# Patient Record
Sex: MALE | Race: WHITE | NOT HISPANIC OR LATINO
[De-identification: names, ages, dates, MRNs, and addresses within clinical notes are randomized per-mention and may not be internally consistent; named-entity substitution may affect disease eponyms.]

---

## 2022-12-01 ENCOUNTER — APPOINTMENT (OUTPATIENT)
Dept: CARE COORDINATION | Facility: HOME HEALTH | Age: 67
End: 2022-12-01

## 2022-12-01 DIAGNOSIS — F32.A DEPRESSION, UNSPECIFIED: ICD-10-CM

## 2022-12-01 DIAGNOSIS — J44.9 CHRONIC OBSTRUCTIVE PULMONARY DISEASE, UNSPECIFIED: ICD-10-CM

## 2022-12-01 DIAGNOSIS — E11.9 TYPE 2 DIABETES MELLITUS W/OUT COMPLICATIONS: ICD-10-CM

## 2022-12-01 DIAGNOSIS — I10 ESSENTIAL (PRIMARY) HYPERTENSION: ICD-10-CM

## 2022-12-01 DIAGNOSIS — F41.9 ANXIETY DISORDER, UNSPECIFIED: ICD-10-CM

## 2022-12-01 PROCEDURE — 99495 TRANSJ CARE MGMT MOD F2F 14D: CPT

## 2022-12-01 PROCEDURE — 99406 BEHAV CHNG SMOKING 3-10 MIN: CPT

## 2022-12-05 VITALS
DIASTOLIC BLOOD PRESSURE: 78 MMHG | OXYGEN SATURATION: 98 % | RESPIRATION RATE: 15 BRPM | HEART RATE: 95 BPM | SYSTOLIC BLOOD PRESSURE: 110 MMHG

## 2022-12-05 PROBLEM — I10 HYPERTENSION: Status: ACTIVE | Noted: 2022-12-05

## 2022-12-05 PROBLEM — F32.A DEPRESSION: Status: ACTIVE | Noted: 2022-12-05

## 2022-12-05 PROBLEM — F41.9 ANXIETY: Status: ACTIVE | Noted: 2022-12-05

## 2022-12-05 RX ORDER — TRAZODONE HYDROCHLORIDE 100 MG/1
100 TABLET ORAL
Refills: 0 | Status: ACTIVE | COMMUNITY

## 2022-12-05 RX ORDER — METHYLNALTREXONE BROMIDE 150 MG/1
150 TABLET ORAL DAILY
Refills: 0 | Status: ACTIVE | COMMUNITY

## 2022-12-05 RX ORDER — AMITRIPTYLINE HYDROCHLORIDE 25 MG/1
25 TABLET, FILM COATED ORAL TWICE DAILY
Refills: 0 | Status: ACTIVE | COMMUNITY

## 2022-12-05 RX ORDER — PANTOPRAZOLE SODIUM 40 MG/1
40 TABLET, DELAYED RELEASE ORAL
Refills: 0 | Status: ACTIVE | COMMUNITY

## 2022-12-05 RX ORDER — TIZANIDINE 4 MG/1
4 TABLET ORAL TWICE DAILY
Refills: 0 | Status: ACTIVE | COMMUNITY

## 2022-12-05 RX ORDER — ERGOCALCIFEROL 1.25 MG/1
1.25 MG CAPSULE ORAL
Refills: 0 | Status: ACTIVE | COMMUNITY

## 2022-12-05 RX ORDER — DULOXETINE HYDROCHLORIDE 20 MG/1
20 CAPSULE, DELAYED RELEASE PELLETS ORAL AT BEDTIME
Refills: 0 | Status: ACTIVE | COMMUNITY

## 2022-12-05 RX ORDER — OXYCODONE 18 MG/1
18 CAPSULE, EXTENDED RELEASE ORAL
Refills: 0 | Status: ACTIVE | COMMUNITY

## 2022-12-05 RX ORDER — DIAZEPAM 10 MG/1
10 TABLET ORAL
Refills: 0 | Status: ACTIVE | COMMUNITY

## 2022-12-05 RX ORDER — PREDNISONE 20 MG/1
20 TABLET ORAL DAILY
Qty: 10 | Refills: 0 | Status: ACTIVE | COMMUNITY

## 2022-12-05 RX ORDER — ALENDRONATE SODIUM 70 MG/1
70 TABLET ORAL
Refills: 0 | Status: ACTIVE | COMMUNITY

## 2022-12-05 NOTE — CURRENT MEDS
[Takes medication as prescribed] : takes [None] : Patient does not have any barriers to medication adherence [Yes] : Reviewed medication list for presence of high-risk medications. [Benzodiazepines] : benzodiazepines [Opioids] : opioids [Muscle Relaxants] : muscle relaxants [Sedatives] : sedatives

## 2022-12-05 NOTE — REVIEW OF SYSTEMS
[Shortness Of Breath] : no shortness of breath [Wheezing] : no wheezing [Cough] : no cough [Dyspnea on Exertion] : dyspnea on exertion [Joint Pain] : no joint pain [Joint Stiffness] : no joint stiffness [Muscle Pain] : no muscle pain [Muscle Weakness] : no muscle weakness [Back Pain] : back pain [Joint Swelling] : no joint swelling [Negative] : Psychiatric

## 2022-12-05 NOTE — HISTORY OF PRESENT ILLNESS
[Post-hospitalization from ___ Hospital] : Post-hospitalization from [unfilled] Hospital [Admitted on: ___] : The patient was admitted on [unfilled] [Discharged on ___] : discharged on [unfilled] [Discharge Summary] : discharge summary [Discharge Med List] : discharge medication list [Med Reconciliation] : medication reconciliation has been completed [Patient Contacted By: ____] : and contacted by [unfilled] [FreeTextEntry2] : Copied from EMR, "HOSPITAL COURSE: he was found to have acute on chronic respiratory failure type 1 and 2 with hypoxia and hypercarbia. placed on bipap he was admitted to ICU. given steroids, nebs and antibiotics his symptoms slowly improved. home bipap was arranged at night and patient discharged home on steroids\par Discharge activity as tolerated\par discharge medication per attached instruction sheet\par discharge FUP with PMD and specialist as advised\par Discharge dispositon : to home\par discharge DIet regular\par Discharge Diagnosis : acute on chronic respiratory failure type 1/2 with hypoxia and hypercarbia, shortness of breath , COPD with acute exacerbation, chronic back pain, anxiety, constipation"\par \par Pt seen for transitional care management w/spouse present. He is using oxygen at 3.5LNC p/ox 98%. He denies SOB, fever, chills. +OTTO, he has quit smoking and is using Nicotrol. He has Trilogy using at night and prn during the day for naps. He has a pain management program that he follows, he does have opioid induced constipation. Last BM today,  not taking Relistor as he feels once he is home, BM routine is back to normal. \par

## 2022-12-05 NOTE — PHYSICAL EXAM
[No Acute Distress] : no acute distress [Well Nourished] : well nourished [Well Developed] : well developed [Well-Appearing] : well-appearing [No Respiratory Distress] : no respiratory distress  [No Accessory Muscle Use] : no accessory muscle use [Decreased Breath Sounds] : breath sounds were decreased diffusely [Normal Rate] : normal rate  [Regular Rhythm] : with a regular rhythm [Normal S1, S2] : normal S1 and S2 [No Murmur] : no murmur heard [No Edema] : there was no peripheral edema [Normal Gait] : normal gait [Coordination Grossly Intact] : coordination grossly intact [No Focal Deficits] : no focal deficits [Speech Grossly Normal] : speech grossly normal [Normal Affect] : the affect was normal [Alert and Oriented x3] : oriented to person, place, and time [Normal Mood] : the mood was normal [Normal Insight/Judgement] : insight and judgment were intact [de-identified] : left anterior wheeze

## 2022-12-05 NOTE — ASSESSMENT
[FreeTextEntry1] : Qamar Landin is being seen after discharge home from [facility]. He was admitted on 11/24/22 for COPD and discharged home on  11/25/22.  Discharge medications were reviewed and reconciled with the current medication list and medications in home.\par \par 1)COPD-no SOB, chills, fever. +OTTO\par -On oxygen 3.5LNC p/ox 98%\par -Diminished BS, left ant wheeze noted. Pt on Advair, albuterol prn\par Using Trilogy at hs and prn for naps during the day\par [facility] [employer] TCM teaching: Advised patient to adhere to all medications including demonstrating proper use of inhalers and nebulizers. Encourage the use of proper breathing techniques such as pursed lip breathing or leaning forward during exhalation. Patient understands proper use of oxygen therapy. Increase activity as tolerated and maintain optimal activity levels. Continue coughing and deep breathing exercises including use of Incentive Spirometry. Receive routine pneumococcal and influenza vaccinations. Identify early signs and sx of disease and notify NP/MD. Maintain proper nutrition and hydration. Follow up with Pulmonologist within 7 days of discharge. Contact information given, patient advised to call with any questions/concerns. \par \par 2)Anxiety-pt speech fast, denies panic attacks. He is on Diazapem prn, he does not see a counselor or psychiatrist.\par \par 3)Diabetes-A1c-6.5 doesn't check FBG\par On Metformin, no episodes of hypoglycemia, he follows a mostly low sugar diet\par \par Reminded of TCM program and encouraged pt to call with any questions or concerns and especially with worsening of symptoms. Verbalized understanding.\par

## 2022-12-05 NOTE — COUNSELING
[Fall prevention counseling provided] : Fall prevention counseling provided [Adequate lighting] : Adequate lighting [No throw rugs] : No throw rugs [Use proper foot wear] : Use proper foot wear [Use recommended devices] : Use recommended devices [Cessation strategies including cessation program discussed] : Cessation strategies including cessation program discussed [Use of nicotine replacement therapies and other medications discussed] : Use of nicotine replacement therapies and other medications discussed [Yes] : Willing to quit smoking [FreeTextEntry1] : 5 [None] : None [Good understanding] : Patient has a good understanding of lifestyle changes and steps needed to achieve self management goal [de-identified] : supportive wife

## 2022-12-19 ENCOUNTER — TRANSCRIPTION ENCOUNTER (OUTPATIENT)
Age: 67
End: 2022-12-19